# Patient Record
(demographics unavailable — no encounter records)

---

## 2024-11-26 NOTE — PHYSICAL EXAM
[IV] : Mallampati Class: IV [Normal Appearance] : normal appearance [Supple] : supple [No Neck Mass] : no neck mass [No JVD] : no jvd [Normal Rate/Rhythm] : normal rate/rhythm [Normal S1, S2] : normal s1, s2 [No Murmurs] : no murmurs [No Resp Distress] : no resp distress [No Acc Muscle Use] : no acc muscle use [Clear to Auscultation Bilaterally] : clear to auscultation bilaterally [Benign] : benign [Not Tender] : not tender [No Masses] : no masses [Soft] : soft [Normal Gait] : normal gait [No Clubbing] : no clubbing [No Cyanosis] : no cyanosis [No Edema] : no edema [No Rash] : no rash [No Motor Deficits] : no motor deficits [Normal Affect] : normal affect [TextBox_2] : pleasant fno distress no cough  raspy  voice [TextBox_11] : no lesion moist

## 2024-11-26 NOTE — PROCEDURE
[FreeTextEntry1] : cxr 7/2023  normal   79y/o female never smoker BMI  PFT 4/25/2024 Fair efforts FVC FEV1 and FEV1/FVC are normal There is no significant bronchodilator response FEF 25-75% is normal MVV is normal  Lung volumes by body plethysmography reveal normal TLC and RV/TLC DLCO and DLCO/Va are normal  Impression Likely normal airflow and TLC, DLCO Recommend clinical correlation

## 2024-11-26 NOTE — HISTORY OF PRESENT ILLNESS
[Never] : never [TextBox_4] : 76y/o    female born in [facility]  never smoker  ( second hand smoke?)    - initially sob   dx copd ?    -placed on    breo  in past - felt    better with inhalers    -wixela bid       -no chest pain  -no  wheezing -feeling less sob  with inhalers   8/2/2023 76y/o female born in [place] never smoker   sob   likely asthma vs COPD -tolerated  glaucoma  surgery did well -wound   patch improved  buttom  - gerd    with  weight loss   22  pounds - improved  breathing  - advair  bid   - off singulair    no issues  - 2/21/2024 79y/o  female  born  in [place] never smoker    bronchiectasis      asthma  vs COPD  - advair   doign well  no  issues  - no wheezing - albuterol  <  1   time  day - dec had uri   took abx  - 5/22/2024 79y/o  female  never smoker   - raspy voice seen by ENT -  post nasal drip improved with  flonase   f/u  bronchiectasis asthma/COPD advair   good - albuterol   MDI  only prn  - feels good  walk improved - PFT   11/26/2024 80y/o  female never   smoker raspy  voice   ENT following    f/u   bronchiectasis   asthma  COPD - advair     doign well  - albuterol MDI  - no cough no sob no chest pain  -

## 2024-11-26 NOTE — ASSESSMENT
[FreeTextEntry1] : 78y/o  female  never smoker  stable  1-  bronchiectasis    asthma vs COPD     2-  chronic - vocal cord issues   -  ENT  3-  allergic rhinitis/  GERD 4-   cxr  ? elevated right hemidiaphragm     ct 2012 nodules  5- vaccinations  per primary  Recommendations 1-   flonase  2- exercise  reviewed  3-   adavir bid rinse after use 4- albuterol MDI two inh  q  6hour prn  sob 5- cxr 6- PFT   return  6 months   agreement on plan

## 2024-11-26 NOTE — REVIEW OF SYSTEMS
[Wheezing] : wheezing [Obesity] : obesity [Fever] : no fever [Fatigue] : no fatigue [Recent Wt Gain (___ Lbs)] : ~T no recent weight gain [Chills] : no chills [Recent Wt Loss (___ Lbs)] : ~T no recent weight loss [Dry Eyes] : no dry eyes [Sore Throat] : no sore throat [Dry Mouth] : no dry mouth [Cough] : no cough [Hemoptysis] : no hemoptysis [Chest Tightness] : no chest tightness [Sputum] : no sputum [Chest Discomfort] : no chest discomfort [Palpitations] : no palpitations [GERD] : no gerd [Abdominal Pain] : no abdominal pain [Nausea] : no nausea [Vomiting] : no vomiting [Dysphagia] : no dysphagia [Bleeding] : no bleeding [Myalgias] : no myalgias [Chronic Pain] : no chronic pain [Rash] : no rash [Blood Transfusion] : no blood transfusion [Clotting Disorder/ Frequent bleeding] : no clotting disorder/ frequent bleeding [Diabetes] : no diabetes [Thyroid Problem] : no thyroid problem [TextBox_69] : improved   medications

## 2024-12-31 NOTE — HISTORY OF PRESENT ILLNESS
[de-identified] : Presents for BP check, labs, and general follow-up.  Remains very active - working - and trying to maintain a healthy diet. Does complain of some sinus and nasal congestion; denies fever, chills.

## 2024-12-31 NOTE — PHYSICAL EXAM
[Normal] : normal rate, regular rhythm, normal S1 and S2 and no murmur heard [No Edema] : there was no peripheral edema [Soft] : abdomen soft [Non Tender] : non-tender [Normal Posterior Cervical Nodes] : no posterior cervical lymphadenopathy [Normal Anterior Cervical Nodes] : no anterior cervical lymphadenopathy [No Focal Deficits] : no focal deficits [Alert and Oriented x3] : oriented to person, place, and time [de-identified] : TMs and pharynx mildly congested without injection

## 2025-03-04 NOTE — ASSESSMENT
[FreeTextEntry1] : -  Voice rest. - Steroid taper -  If no improvement in 2-3 weeks, will refer back to Dr. Reynoso

## 2025-03-04 NOTE — CONSULT LETTER
[Dear  ___] : Dear  [unfilled], [Courtesy Letter:] : I had the pleasure of seeing your patient, [unfilled], in my office today. [Please see my note below.] : Please see my note below. [Sincerely,] : Sincerely, [FreeTextEntry2] : Cristhian Jacques MD ([facility]) [FreeTextEntry3] : Dano Hudson, JENNI, PAMaldonadoC Sr. Physician Assistant, Otolaryngology at [facility] Adjunct Clinical Instructor, Department of Physician Assistant Studies, [facility] [address] [address] DISPLAY PLAN FREE TEXT DISPLAY PLAN FREE TEXT

## 2025-03-04 NOTE — HISTORY OF PRESENT ILLNESS
[de-identified] : Ms. EDUARDO is a 79-year-old female who presents with hoarseness.  She reports that she woke up over the weekend with severe hoarseness.  Her voice is slowly improving since then.  She states that a similar incident happened two years ago and was treated with steroids.  Dr. Reynoso also saw her at the time.  She denies shortness of breath, no dysphagia, no cough. [Difficulty Swallowing] : no difficulty swallowing [Fever] : no fever [Painful Swallowing] : no painful swallowing [Hoarseness] : hoarseness

## 2025-03-04 NOTE — PROCEDURE
[Hoarseness] : hoarseness not clearly evaluated by indirect laryngoscopy [Topical Lidocaine] : topical lidocaine [Oxymetazoline HCl] : oxymetazoline HCl [Flexible Endoscope] : examined with the flexible endoscope [Serial Number: ___] : Serial Number: [unfilled] [Glottis Arytenoid Cartilages Erythema] : bilateral arytenoid ~M erythema [Arytenoid Edema ___ /4] : arytenoid edema [unfilled]U/4 [Arytenoid Erythema ___ /4] : arytenoid erythema [unfilled]U/4 [Normal] : posterior cricoid area had healthy pink mucosa in the interarytenoid area and the esophageal inlet

## 2025-03-14 NOTE — HISTORY OF PRESENT ILLNESS
[de-identified] : YAMIL EDUARDO is a 77 year old woman who presents to the [facility] with dysphonia. Last seen 1/26/23. Was treated for acute laryngitis with a steroid taper. Was to follow up PRN. Saw Raymond Barnes 05/04/25 for laryngitis-She completed steroid taper with some relief.  States voice is hoarse at baseline however voice is very strained.  She is trying to rest her voice.  Currently has excessive phlegm in throat.  Denies shortness of breath, no dysphagia, pain while speaking, no cough, throat pain, neck swelling, cough, hemoptysis and fevers   Previously reported: difficulty phonating. Patient was previously seen by Dr. 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 Patient was seen by Dr. Masterson on 1/23/23- prescribed prednisone. Prior to seeing Dr. Masterson could not speak at all, but since starting steroids voice has started to return. Is now 50% of normal (compared to 0% of normal previously). Has glaucoma and monitored by an ophthalmologist with eye drops. States always had a raspy voice since childhood- but had complete loss of voice 3+ weeks ago. This was also accompanied by some SOB. Her pulm Dr. Cook decreased her inhaled steroid and nasal steroid at that time. Also had nasal congestion at that time that quickly resolved. No other symptoms when voice was first lost. She now notes periods of normal voicing- improved with prednisone. Denies anterior neck discomfort or pain with voicing. Denies frequent globus sensation. Denies specific difficulties with swallowing. She notes frequent classic heartburn symptoms- taking omeprazole daily with relief VOCAL DEMANDS: Her vocal demands are primarily those of conversational

## 2025-03-14 NOTE — PROCEDURE
[de-identified] : Stroboscopic Laryngoscopy Procedure Note:  Indication:	Assess laryngeal biomechanics and vocal fold oscillation.  Description of Procedure:	Informed consent was verbally obtained from the patient prior to the procedure. The patient was seated in the clinic chair. Topical anesthesia was achieved by first spraying the nasal cavities with 4% lidocaine and nasal decongestant.   Findings:  Supraglottis: diffuse mild white adherent exudate Glottis:    Structure:                        Right: shallow divet posterior vibratory edge, diffuse mild white adherent exudate                       Left:  diffuse mild white adherent exudate                Mobility:                        Right:  normal                        Left:  normal                Amplitude:                        Right:  decreased                       Left:  decreased                Closure: irregular                Wave symmetry:  asymmetric  Subglottis: no masses or lesions within the visualized subglottis Visualized airway is widely patent.

## 2025-03-14 NOTE — PROCEDURE
[de-identified] : Stroboscopic Laryngoscopy Procedure Note:  Indication:	Assess laryngeal biomechanics and vocal fold oscillation.  Description of Procedure:	Informed consent was verbally obtained from the patient prior to the procedure. The patient was seated in the clinic chair. Topical anesthesia was achieved by first spraying the nasal cavities with 4% lidocaine and nasal decongestant.   Findings:  Supraglottis: diffuse mild white adherent exudate Glottis:    Structure:                        Right: shallow divet posterior vibratory edge, diffuse mild white adherent exudate                       Left:  diffuse mild white adherent exudate                Mobility:                        Right:  normal                        Left:  normal                Amplitude:                        Right:  decreased                       Left:  decreased                Closure: irregular                Wave symmetry:  asymmetric  Subglottis: no masses or lesions within the visualized subglottis Visualized airway is widely patent.

## 2025-03-14 NOTE — ASSESSMENT
[FreeTextEntry1] : Assessment/Plan: #1 Dysphonia  #2 Fungal laryngitits  Patient may continue her diskus 1 puff bid.  Will start fluconazole 200mg daily x 10 days. The risks, benefits, and alternatives to care were discussed with the patient and understanding expressed. I will see back in 13 days as she is giving an important speech in 2 weeks.  She has baseline significant dysphonia since childhood and I will also like to see her back when voice is back to baseline.

## 2025-03-14 NOTE — HISTORY OF PRESENT ILLNESS
[de-identified] : YAMIL EDUARDO is a 77 year old woman who presents to the [facility] with dysphonia. Last seen 1/26/23. Was treated for acute laryngitis with a steroid taper. Was to follow up PRN. Saw Raymond Barnes 05/04/25 for laryngitis-She completed steroid taper with some relief.  States voice is hoarse at baseline however voice is very strained.  She is trying to rest her voice.  Currently has excessive phlegm in throat.  Denies shortness of breath, no dysphagia, pain while speaking, no cough, throat pain, neck swelling, cough, hemoptysis and fevers   Previously reported: difficulty phonating. Patient was previously seen by Dr. 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 Patient was seen by Dr. Masterson on 1/23/23- prescribed prednisone. Prior to seeing Dr. Masterson could not speak at all, but since starting steroids voice has started to return. Is now 50% of normal (compared to 0% of normal previously). Has glaucoma and monitored by an ophthalmologist with eye drops. States always had a raspy voice since childhood- but had complete loss of voice 3+ weeks ago. This was also accompanied by some SOB. Her pulm Dr. Cook decreased her inhaled steroid and nasal steroid at that time. Also had nasal congestion at that time that quickly resolved. No other symptoms when voice was first lost. She now notes periods of normal voicing- improved with prednisone. Denies anterior neck discomfort or pain with voicing. Denies frequent globus sensation. Denies specific difficulties with swallowing. She notes frequent classic heartburn symptoms- taking omeprazole daily with relief VOCAL DEMANDS: Her vocal demands are primarily those of conversational

## 2025-03-27 NOTE — HISTORY OF PRESENT ILLNESS
[de-identified] :  YAMIL EDUARDO is a 77 year old woman who presents to the [facility] with dysphonia and fungal laryngitis. Last seen 3/14/25. Pt was to continue diskus, start fluconazole, and follow up in 13 days as she had an important speech in 2 weeks. She has had baseline significant dysphonia since childhood. Completed fluconazole on Sunday. Brain fog also cleared up three days ago right after completing fluconazole. Continues use of diskus. Has been gargling with water after use of inhaler. Feels voice is greatly improved and is back to baseline. No trouble swallowing or breathing.   Previously reported: difficulty phonating. Patient was previously seen by Dr. 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 Patient was seen by Dr. Masterson on 1/23/23- prescribed prednisone. Prior to seeing Dr. Masterson could not speak at all, but since starting steroids voice has started to return. Is now 50% of normal (compared to 0% of normal previously). Has glaucoma and monitored by an ophthalmologist with eye drops. States always had a raspy voice since childhood- but had complete loss of voice 3+ weeks ago. This was also accompanied by some SOB. Her pulm Dr. Cook decreased her inhaled steroid and nasal steroid at that time. Also had nasal congestion at that time that quickly resolved. No other symptoms when voice was first lost. She now notes periods of normal voicing- improved with prednisone. Denies anterior neck discomfort or pain with voicing. Denies frequent globus sensation. Denies specific difficulties with swallowing. She notes frequent classic heartburn symptoms- taking omeprazole daily with relief VOCAL DEMANDS: Her vocal demands are primarily those of conversational

## 2025-03-27 NOTE — HISTORY OF PRESENT ILLNESS
[de-identified] :  YAMIL EDUARDO is a 77 year old woman who presents to the [facility] with dysphonia and fungal laryngitis. Last seen 3/14/25. Pt was to continue diskus, start fluconazole, and follow up in 13 days as she had an important speech in 2 weeks. She has had baseline significant dysphonia since childhood. Completed fluconazole on Sunday. Brain fog also cleared up three days ago right after completing fluconazole. Continues use of diskus. Has been gargling with water after use of inhaler. Feels voice is greatly improved and is back to baseline. No trouble swallowing or breathing.   Previously reported: difficulty phonating. Patient was previously seen by Dr. 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 Patient was seen by Dr. Masterson on 1/23/23- prescribed prednisone. Prior to seeing Dr. Masterson could not speak at all, but since starting steroids voice has started to return. Is now 50% of normal (compared to 0% of normal previously). Has glaucoma and monitored by an ophthalmologist with eye drops. States always had a raspy voice since childhood- but had complete loss of voice 3+ weeks ago. This was also accompanied by some SOB. Her pulm Dr. Cook decreased her inhaled steroid and nasal steroid at that time. Also had nasal congestion at that time that quickly resolved. No other symptoms when voice was first lost. She now notes periods of normal voicing- improved with prednisone. Denies anterior neck discomfort or pain with voicing. Denies frequent globus sensation. Denies specific difficulties with swallowing. She notes frequent classic heartburn symptoms- taking omeprazole daily with relief VOCAL DEMANDS: Her vocal demands are primarily those of conversational

## 2025-03-27 NOTE — PROCEDURE
[de-identified] : Laryngoscopy: Stroboscopic Laryngoscopy Procedure Note: Indication: Assess laryngeal biomechanics and vocal fold oscillation. Description of Procedure: Informed consent was verbally obtained from the patient prior to the procedure. The patient was seated in the clinic chair. Topical anesthesia was achieved by first spraying the nasal cavities with 4% lidocaine and nasal decongestant.  Findings: Supraglottis: normal Glottis: Structure:  Right: shallow divet posterior vibratory edge, mild erythema, likely scar  Left: mild erythema, likely scar  Mobility:  Right: normal  Left: normal  Amplitude:  Right: decreased  Left: decreased  Closure: irregular  Wave symmetry: asymmetric Subglottis: no masses or lesions within the visualized subglottis Visualized airway is widely patent. Other: moderate supraglottic squeeze

## 2025-03-27 NOTE — ASSESSMENT
[FreeTextEntry1] : Assessment/Plan: #1 Dysphonia #2 Hx of fungal laryngitis #3 MTD  Pt is very happy with where her voice is currently at. Pt is not interested in voice therapy at this time. Pt will follow up in 6mths.

## 2025-03-27 NOTE — PROCEDURE
[de-identified] : Laryngoscopy: Stroboscopic Laryngoscopy Procedure Note: Indication: Assess laryngeal biomechanics and vocal fold oscillation. Description of Procedure: Informed consent was verbally obtained from the patient prior to the procedure. The patient was seated in the clinic chair. Topical anesthesia was achieved by first spraying the nasal cavities with 4% lidocaine and nasal decongestant.  Findings: Supraglottis: normal Glottis: Structure:  Right: shallow divet posterior vibratory edge, mild erythema, likely scar  Left: mild erythema, likely scar  Mobility:  Right: normal  Left: normal  Amplitude:  Right: decreased  Left: decreased  Closure: irregular  Wave symmetry: asymmetric Subglottis: no masses or lesions within the visualized subglottis Visualized airway is widely patent. Other: moderate supraglottic squeeze

## 2025-04-28 NOTE — HISTORY OF PRESENT ILLNESS
[de-identified] : - Summary : 79-year-old female presenting with hip and groin pain that started a week ago, worsening over the last three days. Patient reports using a cane for mobility and thick cushions for comfort in car and chair. - Chief Complaint (CC) : Hip and groin pain - History of Present Illness (HPI) : Patient reports pain in the hip and groin that started approximately one week ago, with significant worsening over the past three days. She has been using a cane for mobility. The patient mentions using thick cushions in her car and chair, which she suspects may have contributed to the pain. She has been using Voltaren gel and taking Tylenol 650mg for pain relief. Ice application has provided temporary relief while sitting. The pain worsens with movement. No recent falls or unusual activities reported. Patient denies significant back pain but mentions using a heating pad for relaxation before bed. - Past Medical History : - History of double knee replacement in 2008  - Arthritis  - Past Surgical History : Bilateral knee replacement in 2008 - Family History : - Social History : - Lives independently  - Works as a  to an 87-year-old individual  - Other History : - Review of Systems : - Musculoskeletal: Positive for hip and groin pain, difficulty walking  - Neurological: Negative for numbness or tingling  - General: Denies recent falls or injuries  - Medications : - Tylenol 650mg (Active)  - Voltaren gel (topical) (Active)  - Allergies : - No Known Drug Allergies

## 2025-04-28 NOTE — DISCUSSION/SUMMARY
[Medication Risks Reviewed] : Medication risks reviewed [de-identified] : Assessment: - Summary : The patient presents with symptoms and physical exam findings consistent with right hip bursitis. There is also evidence of mild right hip arthritis on X-ray, but the current symptoms appear to be primarily related to bursitis. - Problems : - Right hip bursitis  - Mild right hip arthritis  - Differential Diagnosis : - Trochanteric bursitis: Most likely based on location of pain and tenderness on exam  - Hip osteoarthritis: Present on X-ray but less likely to be the primary cause of acute symptoms  - Referred pain from lumbar spine: Less likely given the localized nature of the pain and lack of significant back symptoms  Plan: - Summary : The treatment plan focuses on addressing the patient's right hip bursitis while monitoring the underlying hip arthritis. We will start with a conservative approach using a combination of medication, injection therapy, and physical therapy. - Plan : - Administer cortisone injection with anesthetic to the affected area of the right hip.  Under sterile conditions 40 mg Depo-Medrol mixed with a 6 cc solution of 1% lidocaine and 0.25% Marcaine was injected into the right hip trochanteric bursa by the nurse practitioner without incident.  The patient reported more than 70% improvement 5 minutes after the injection.  - Discontinue Voltaren gel use following the injection  - Provide referral for physical therapy to work on hip stretches and strengthening exercises  - Continue using ice for pain relief as needed  - Follow up in 2-4 week to assess response to treatment  - If symptoms persist, consider MRI of the hip for further evaluation  - Educate patient on proper seating ergonomics and avoiding prolonged periods in uncomfortable positions

## 2025-04-28 NOTE — HISTORY OF PRESENT ILLNESS
[de-identified] : - Summary : 79-year-old female presenting with hip and groin pain that started a week ago, worsening over the last three days. Patient reports using a cane for mobility and thick cushions for comfort in car and chair. - Chief Complaint (CC) : Hip and groin pain - History of Present Illness (HPI) : Patient reports pain in the hip and groin that started approximately one week ago, with significant worsening over the past three days. She has been using a cane for mobility. The patient mentions using thick cushions in her car and chair, which she suspects may have contributed to the pain. She has been using Voltaren gel and taking Tylenol 650mg for pain relief. Ice application has provided temporary relief while sitting. The pain worsens with movement. No recent falls or unusual activities reported. Patient denies significant back pain but mentions using a heating pad for relaxation before bed. - Past Medical History : - History of double knee replacement in 2008  - Arthritis  - Past Surgical History : Bilateral knee replacement in 2008 - Family History : - Social History : - Lives independently  - Works as a  to an 87-year-old individual  - Other History : - Review of Systems : - Musculoskeletal: Positive for hip and groin pain, difficulty walking  - Neurological: Negative for numbness or tingling  - General: Denies recent falls or injuries  - Medications : - Tylenol 650mg (Active)  - Voltaren gel (topical) (Active)  - Allergies : - No Known Drug Allergies

## 2025-04-28 NOTE — PHYSICAL EXAM
[Antalgic] : antalgic [Cane] : ambulates with cane [LE] : Sensory: Intact in bilateral lower extremities [1+] : left ankle jerk 1+ [Obese] : obese [SLR] : negative straight leg raise [Plantar Reflex Right Only] : absent on the right [Plantar Reflex Left Only] : absent on the left [DTR Reflexes Clonus Of Right Ankle (___ Beats)] : absent on the right [DTR Reflexes Clonus Of Left Ankle (___ Beats)] : absent on the left [de-identified] : healed knee incisions right GT tenderness She can forward flex to her knees and extend 30 degrees as well as twist 40 degrees bilaterally without pain across her lumbar spine No significant groin pain with hip internal/external rotation bilaterally [de-identified] : AP pelvis and right hip lateral demonstrates degenerative changes of the right hip with loss of joint space and suggestion of some subchondral cysts.  Enthesopathy of the greater trochanter bilaterally is noted as well.  No obvious acute fractures

## 2025-04-28 NOTE — PHYSICAL EXAM
[Antalgic] : antalgic [Cane] : ambulates with cane [LE] : Sensory: Intact in bilateral lower extremities [1+] : left ankle jerk 1+ [Obese] : obese [SLR] : negative straight leg raise [Plantar Reflex Right Only] : absent on the right [Plantar Reflex Left Only] : absent on the left [DTR Reflexes Clonus Of Right Ankle (___ Beats)] : absent on the right [DTR Reflexes Clonus Of Left Ankle (___ Beats)] : absent on the left [de-identified] : healed knee incisions right GT tenderness She can forward flex to her knees and extend 30 degrees as well as twist 40 degrees bilaterally without pain across her lumbar spine No significant groin pain with hip internal/external rotation bilaterally [de-identified] : AP pelvis and right hip lateral demonstrates degenerative changes of the right hip with loss of joint space and suggestion of some subchondral cysts.  Enthesopathy of the greater trochanter bilaterally is noted as well.  No obvious acute fractures

## 2025-04-28 NOTE — DISCUSSION/SUMMARY
[Medication Risks Reviewed] : Medication risks reviewed [de-identified] : Assessment: - Summary : The patient presents with symptoms and physical exam findings consistent with right hip bursitis. There is also evidence of mild right hip arthritis on X-ray, but the current symptoms appear to be primarily related to bursitis. - Problems : - Right hip bursitis  - Mild right hip arthritis  - Differential Diagnosis : - Trochanteric bursitis: Most likely based on location of pain and tenderness on exam  - Hip osteoarthritis: Present on X-ray but less likely to be the primary cause of acute symptoms  - Referred pain from lumbar spine: Less likely given the localized nature of the pain and lack of significant back symptoms  Plan: - Summary : The treatment plan focuses on addressing the patient's right hip bursitis while monitoring the underlying hip arthritis. We will start with a conservative approach using a combination of medication, injection therapy, and physical therapy. - Plan : - Administer cortisone injection with anesthetic to the affected area of the right hip.  Under sterile conditions 40 mg Depo-Medrol mixed with a 6 cc solution of 1% lidocaine and 0.25% Marcaine was injected into the right hip trochanteric bursa by the nurse practitioner without incident.  The patient reported more than 70% improvement 5 minutes after the injection.  - Discontinue Voltaren gel use following the injection  - Provide referral for physical therapy to work on hip stretches and strengthening exercises  - Continue using ice for pain relief as needed  - Follow up in 2-4 week to assess response to treatment  - If symptoms persist, consider MRI of the hip for further evaluation  - Educate patient on proper seating ergonomics and avoiding prolonged periods in uncomfortable positions

## 2025-05-21 NOTE — ASSESSMENT
[FreeTextEntry1] : Trial of MiraLax.  Take omeprazole.  Get EGD. Explained the risks, benefits, indications, alternatives. The risks include but are not limited to bleeding, infection, perforation, reaction to anesthesia, or missed lesions. The patient verbalized understanding and wishes to proceed.

## 2025-05-21 NOTE — REVIEW OF SYSTEMS
[Negative] : Heme/Lymph [As Noted in HPI] : as noted in HPI [Abdominal Pain] : abdominal pain [Constipation] : no constipation [Diarrhea] : no diarrhea [Heartburn] : heartburn

## 2025-05-21 NOTE — PHYSICAL EXAM
[Alert] : alert [Normal Voice/Communication] : normal voice/communication [Healthy Appearing] : healthy appearing [No Acute Distress] : no acute distress [Sclera] : the sclera and conjunctiva were normal [Hearing Threshold Finger Rub Not [place]] : hearing was normal [Normal Lips/Gums] : the lips and gums were normal [Oropharynx] : the oropharynx was normal [Normal Appearance] : the appearance of the neck was normal [No Neck Mass] : no neck mass was observed [No Respiratory Distress] : no respiratory distress [No Acc Muscle Use] : no accessory muscle use [Respiration, Rhythm And Depth] : normal respiratory rhythm and effort [Auscultation Breath Sounds / Voice Sounds] : lungs were clear to auscultation bilaterally [Heart Rate And Rhythm] : heart rate was normal and rhythm regular [Normal S1, S2] : normal S1 and S2 [Murmurs] : no murmurs [Bowel Sounds] : normal bowel sounds [Abdomen Tenderness] : non-tender [No Masses] : no abdominal mass palpated [Abdomen Soft] : soft [] : no hepatosplenomegaly [No CVA Tenderness] : no CVA  tenderness [Abnormal Walk] : normal gait [Normal Color / Pigmentation] : normal skin color and pigmentation [No Focal Deficits] : no focal deficits [Oriented To Time, Place, And Person] : oriented to person, place, and time

## 2025-05-21 NOTE — HISTORY OF PRESENT ILLNESS
[FreeTextEntry1] : Presents for initial evaluation for worsening ?GERD. Takes esomeprazole for many years. Dr. Moralez told her to stop it was off for a few weeks then her GERD returned. She restarted 1 week ago. Reports some discomfort at present. Denies rectal bleeding, melena. Also c/o constipation. Not taking any medical treatment for this.  Was seeing [facility] in [place]. Last EGD and colonoscopy 2018, reports reviewed. Was told she might have Pastor's.

## 2025-06-02 NOTE — HISTORY OF PRESENT ILLNESS
[de-identified] : Patient is a 79-year-old female who presents today for an evaluation regarding her right hip.  She states that the pain started in early April and describes it as significant discomfort on the lateral and posterior lateral aspect of the hip.  She states that she does have difficulty ambulating but most of her pain is from prolonged sitting or any strenuous activity such as bending.  She does state that at times it does radiate to the groin.  She originally had an appointment with Dr. Johana Daugherty earlier this month.  However at that time she was told to try a Toradol shot and to follow with Dr. Catherine/myself due to hip pain.  She states that prior to early April she was doing fairly well but does have a history of low back pain with sciatica.  Currently the pain is an 8 on a scale of 1-10.  She states that she was given a prescription for anti-inflammatories but was unable to take it as this created side effects.  And is currently using Lidoderm patches.

## 2025-06-02 NOTE — HISTORY OF PRESENT ILLNESS
[de-identified] : Patient is a 79-year-old female who presents today for an evaluation regarding her right hip.  She states that the pain started in early April and describes it as significant discomfort on the lateral and posterior lateral aspect of the hip.  She states that she does have difficulty ambulating but most of her pain is from prolonged sitting or any strenuous activity such as bending.  She does state that at times it does radiate to the groin.  She originally had an appointment with Dr. Johana Daugherty earlier this month.  However at that time she was told to try a Toradol shot and to follow with Dr. Catherine/myself due to hip pain.  She states that prior to early April she was doing fairly well but does have a history of low back pain with sciatica.  Currently the pain is an 8 on a scale of 1-10.  She states that she was given a prescription for anti-inflammatories but was unable to take it as this created side effects.  And is currently using Lidoderm patches.

## 2025-06-02 NOTE — PHYSICAL EXAM
[Antalgic] : antalgic [de-identified] : On physical examination of the right hip.  The skin is clean dry and intact with no areas of redness swelling or heat noted.  There is some pain and tenderness which is mostly on the lateral aspect but is much more significant in the posterior lateral and lateral aspect.  This ranges from the right paraspinal muscles through the SI joint and along the posterior and posterior lateral aspect of the buttocks into the thigh.  She does have a positive straight leg test with Tinel's.  There is some increased a discomfort with internal rotation.  But is able to have good range of motion passive and actively.  There is no evidence of limb length discrepancy.  No evidence of any motor or sensory deficit.  Patient has good distal pulses and no calf tenderness. [de-identified] : X-rays of the right hip were taken in the office.  This included 2 views the AP pelvis as well as the lateral right hip.  The results show minimal narrowing of the joint spacing.  There is no evidence of any fracture or dislocation.

## 2025-06-02 NOTE — DISCUSSION/SUMMARY
[de-identified] : After thorough history full examination and review of the x-rays.  It was explained to the patient that she does have some lateral hip pain.  The majority of the discomfort is believed to be coming from the lower back.  She does present with a significant lower back pain with sciatica.  Therefore further diagnostic testing is warranted.  This would include an MRI of the hip to fully evaluate the hip and surrounding tissue.  But also an MRI of the lumbar spine.  She is advised to continue with conservative management with ice pack/moist heat and pain medication. And to return back to the office to discuss the MRI findings and further medical management.  And to see exactly where the majority of her discomfort/pain is stemming from.  She was given a prescription for Toradol to be taken as needed.  Patient has having side effects with significant fluctuations of her blood pressure.  And therefore not recommended at this present time to take anti-inflammatories.  35 minutes were spent, face to face, in direct consultation with the patient. This includes reviewing the natural history of their Dx., eliciting the history, performing an orthopedic exam, review of the x-ray findings, forming a differential Dx and discussing all treatment options. This Includes both surgical and non-surgical treatments. I also reviewed all the risks and benefits of non-operative & operative Tx options, future impact into orthopedic functions/problems, activity restrictions both at home and at work, and all follow up requirements.

## 2025-06-02 NOTE — DISCUSSION/SUMMARY
[de-identified] : After thorough history full examination and review of the x-rays.  It was explained to the patient that she does have some lateral hip pain.  The majority of the discomfort is believed to be coming from the lower back.  She does present with a significant lower back pain with sciatica.  Therefore further diagnostic testing is warranted.  This would include an MRI of the hip to fully evaluate the hip and surrounding tissue.  But also an MRI of the lumbar spine.  She is advised to continue with conservative management with ice pack/moist heat and pain medication. And to return back to the office to discuss the MRI findings and further medical management.  And to see exactly where the majority of her discomfort/pain is stemming from.  She was given a prescription for Toradol to be taken as needed.  Patient has having side effects with significant fluctuations of her blood pressure.  And therefore not recommended at this present time to take anti-inflammatories.  35 minutes were spent, face to face, in direct consultation with the patient. This includes reviewing the natural history of their Dx., eliciting the history, performing an orthopedic exam, review of the x-ray findings, forming a differential Dx and discussing all treatment options. This Includes both surgical and non-surgical treatments. I also reviewed all the risks and benefits of non-operative & operative Tx options, future impact into orthopedic functions/problems, activity restrictions both at home and at work, and all follow up requirements.

## 2025-06-19 NOTE — PHYSICAL EXAM
[LE] : Sensory: Intact in bilateral lower extremities [ALL] : dorsalis pedis, posterior tibial, femoral, popliteal, and radial 2+ and symmetric bilaterally [de-identified] : On physical examination of the right hip.  The skin is clean dry and intact with no areas of redness swelling or heat noted.  There is some pain and tenderness on the lateral aspect of the hip which does radiate into the groin.  Which is worse with passive and active range of motion.  Patient is neurovascularly intact with good distal pulses [de-identified] : No x-rays were performed at today's office visit

## 2025-06-19 NOTE — DISCUSSION/SUMMARY
[de-identified] : After thorough history full examination and review of the x-rays.  It was explained to the patient that she does have some osteoarthritic changes.  She was explained the different modalities of treatment which include conservative management versus injection versus surgical intervention.  However at this present time it is suggested that she continue conservatively with exercises ice pack/moist heat and anti-inflammatories.  She was also referred for a ultrasound-guided intra-articular cortisone injection.  She was explained the risk benefits pros and cons of the injection as well as alternatives.  She was also explained there is no guarantee for complete relief.  And that this is not a cure for the osteoarthritis.  Patient did understand and took the prescription.  She will return back to the office should the pain persist or worsen.  35 minutes were spent, face to face, in direct consultation with the patient. This includes reviewing the natural history of their Dx., eliciting the history, performing an orthopedic exam, review of the x-ray findings, forming a differential Dx and discussing all treatment options. This Includes both surgical and non-surgical treatments. I also reviewed all the risks and benefits of non-operative & operative Tx options, future impact into orthopedic functions/problems, activity restrictions both at home and at work, and all follow up requirements.

## 2025-06-19 NOTE — HISTORY OF PRESENT ILLNESS
[de-identified] : Patient is a 79-year-old female who presents today for an evaluation regarding her right hip.  She has been seen in the past regarding her right hip and advised to receive an MRI of the lumbar spine and right hip.  She states that she still feels discomfort which is worse with any strenuous activities.  She denies any history of new or recent injury.

## 2025-06-19 NOTE — HISTORY OF PRESENT ILLNESS
[de-identified] : Patient is a 79-year-old female who presents today for an evaluation regarding her right hip.  She has been seen in the past regarding her right hip and advised to receive an MRI of the lumbar spine and right hip.  She states that she still feels discomfort which is worse with any strenuous activities.  She denies any history of new or recent injury.

## 2025-06-19 NOTE — PHYSICAL EXAM
[LE] : Sensory: Intact in bilateral lower extremities [ALL] : dorsalis pedis, posterior tibial, femoral, popliteal, and radial 2+ and symmetric bilaterally [de-identified] : On physical examination of the right hip.  The skin is clean dry and intact with no areas of redness swelling or heat noted.  There is some pain and tenderness on the lateral aspect of the hip which does radiate into the groin.  Which is worse with passive and active range of motion.  Patient is neurovascularly intact with good distal pulses [de-identified] : No x-rays were performed at today's office visit

## 2025-06-19 NOTE — DISCUSSION/SUMMARY
[de-identified] : After thorough history full examination and review of the x-rays.  It was explained to the patient that she does have some osteoarthritic changes.  She was explained the different modalities of treatment which include conservative management versus injection versus surgical intervention.  However at this present time it is suggested that she continue conservatively with exercises ice pack/moist heat and anti-inflammatories.  She was also referred for a ultrasound-guided intra-articular cortisone injection.  She was explained the risk benefits pros and cons of the injection as well as alternatives.  She was also explained there is no guarantee for complete relief.  And that this is not a cure for the osteoarthritis.  Patient did understand and took the prescription.  She will return back to the office should the pain persist or worsen.  35 minutes were spent, face to face, in direct consultation with the patient. This includes reviewing the natural history of their Dx., eliciting the history, performing an orthopedic exam, review of the x-ray findings, forming a differential Dx and discussing all treatment options. This Includes both surgical and non-surgical treatments. I also reviewed all the risks and benefits of non-operative & operative Tx options, future impact into orthopedic functions/problems, activity restrictions both at home and at work, and all follow up requirements.

## 2025-07-07 NOTE — HISTORY OF PRESENT ILLNESS
[de-identified] : Presents for BP check, labs, and general follow-up.  Following with Ortho for ongoing R hip issues.  Also note Nephrology - states has been drinking "real juice" at Dr. Moralez's instruction.